# Patient Record
Sex: FEMALE | URBAN - METROPOLITAN AREA
[De-identification: names, ages, dates, MRNs, and addresses within clinical notes are randomized per-mention and may not be internally consistent; named-entity substitution may affect disease eponyms.]

---

## 2022-07-06 ENCOUNTER — LAB REQUISITION (OUTPATIENT)
Dept: LAB | Age: 37
End: 2022-07-06

## 2022-07-06 DIAGNOSIS — R61 GENERALIZED HYPERHIDROSIS: ICD-10-CM

## 2022-07-06 DIAGNOSIS — Z13.29 ENCOUNTER FOR SCREENING FOR OTHER SUSPECTED ENDOCRINE DISORDER: ICD-10-CM

## 2022-07-06 DIAGNOSIS — Z00.00 ENCOUNTER FOR GENERAL ADULT MEDICAL EXAMINATION WITHOUT ABNORMAL FINDINGS: ICD-10-CM

## 2022-07-06 DIAGNOSIS — Z13.21 ENCOUNTER FOR SCREENING FOR NUTRITIONAL DISORDER: ICD-10-CM

## 2022-07-06 DIAGNOSIS — R53.83 OTHER FATIGUE: ICD-10-CM

## 2022-07-07 PROCEDURE — 84481 FREE ASSAY (FT-3): CPT | Performed by: CLINICAL MEDICAL LABORATORY

## 2022-07-07 PROCEDURE — 86663 EPSTEIN-BARR ANTIBODY: CPT | Performed by: CLINICAL MEDICAL LABORATORY

## 2022-07-07 PROCEDURE — 80061 LIPID PANEL: CPT | Performed by: CLINICAL MEDICAL LABORATORY

## 2022-07-07 PROCEDURE — 86800 THYROGLOBULIN ANTIBODY: CPT | Performed by: CLINICAL MEDICAL LABORATORY

## 2022-07-07 PROCEDURE — PSEU8506 C REACTIVE PROTEIN HIGH SENSITIVITY: Performed by: CLINICAL MEDICAL LABORATORY

## 2022-07-07 PROCEDURE — PSEU8476 EPSTEIN BARR VIRUS CHRONIC PANEL: Performed by: CLINICAL MEDICAL LABORATORY

## 2022-07-07 PROCEDURE — PSEU8274 MAGNESIUM: Performed by: CLINICAL MEDICAL LABORATORY

## 2022-07-07 PROCEDURE — 84482 T3 REVERSE: CPT | Performed by: CLINICAL MEDICAL LABORATORY

## 2022-07-07 PROCEDURE — PSEU8223 T3, TOTAL: Performed by: CLINICAL MEDICAL LABORATORY

## 2022-07-07 PROCEDURE — PSEU10042 COPPER, BLOOD: Performed by: CLINICAL MEDICAL LABORATORY

## 2022-07-07 PROCEDURE — PSEU9812 SELENIUM, PLASMA: Performed by: CLINICAL MEDICAL LABORATORY

## 2022-07-07 PROCEDURE — PSEU9633 IODIDE: Performed by: CLINICAL MEDICAL LABORATORY

## 2022-07-07 PROCEDURE — PSEU8904 ZINC: Performed by: CLINICAL MEDICAL LABORATORY

## 2022-07-07 PROCEDURE — 84480 ASSAY TRIIODOTHYRONINE (T3): CPT | Performed by: CLINICAL MEDICAL LABORATORY

## 2022-07-07 PROCEDURE — 84144 ASSAY OF PROGESTERONE: CPT | Performed by: CLINICAL MEDICAL LABORATORY

## 2022-07-07 PROCEDURE — 83525 ASSAY OF INSULIN: CPT | Performed by: CLINICAL MEDICAL LABORATORY

## 2022-07-07 PROCEDURE — 82627 DEHYDROEPIANDROSTERONE: CPT | Performed by: CLINICAL MEDICAL LABORATORY

## 2022-07-07 PROCEDURE — 82525 ASSAY OF COPPER: CPT | Performed by: CLINICAL MEDICAL LABORATORY

## 2022-07-07 PROCEDURE — 83550 IRON BINDING TEST: CPT | Performed by: CLINICAL MEDICAL LABORATORY

## 2022-07-07 PROCEDURE — 82728 ASSAY OF FERRITIN: CPT | Performed by: CLINICAL MEDICAL LABORATORY

## 2022-07-07 PROCEDURE — 86376 MICROSOMAL ANTIBODY EACH: CPT | Performed by: CLINICAL MEDICAL LABORATORY

## 2022-07-07 PROCEDURE — 84439 ASSAY OF FREE THYROXINE: CPT | Performed by: CLINICAL MEDICAL LABORATORY

## 2022-07-07 PROCEDURE — PSEU8532 HOMOCYSTEINE TOTAL: Performed by: CLINICAL MEDICAL LABORATORY

## 2022-07-07 PROCEDURE — PSEU8556 THYROID ANTIBODIES: Performed by: CLINICAL MEDICAL LABORATORY

## 2022-07-07 PROCEDURE — PSEU8229 VITAMIN D -25 HYDROXY: Performed by: CLINICAL MEDICAL LABORATORY

## 2022-07-07 PROCEDURE — 85025 COMPLETE CBC W/AUTO DIFF WBC: CPT | Performed by: CLINICAL MEDICAL LABORATORY

## 2022-07-07 PROCEDURE — PSEU8381 SEDIMENTATION RATE WESTERGREN: Performed by: CLINICAL MEDICAL LABORATORY

## 2022-07-07 PROCEDURE — 83018 HEAVY METAL QUAN EACH NES: CPT | Performed by: CLINICAL MEDICAL LABORATORY

## 2022-07-07 PROCEDURE — PSEU8187 CORTISOL: Performed by: CLINICAL MEDICAL LABORATORY

## 2022-07-07 PROCEDURE — PSEU8115 INSULIN LEVEL, FASTING: Performed by: CLINICAL MEDICAL LABORATORY

## 2022-07-07 PROCEDURE — 82533 TOTAL CORTISOL: CPT | Performed by: CLINICAL MEDICAL LABORATORY

## 2022-07-07 PROCEDURE — PSEU8306 VITAMIN B12: Performed by: CLINICAL MEDICAL LABORATORY

## 2022-07-07 PROCEDURE — 83036 HEMOGLOBIN GLYCOSYLATED A1C: CPT | Performed by: CLINICAL MEDICAL LABORATORY

## 2022-07-07 PROCEDURE — PSEU8286 PROGESTERONE: Performed by: CLINICAL MEDICAL LABORATORY

## 2022-07-07 PROCEDURE — PSEU8135 LIPID PANEL WITH REFLEX: Performed by: CLINICAL MEDICAL LABORATORY

## 2022-07-07 PROCEDURE — PSEU8203 IRON AND TOTAL IRON BINDING CAPACITY: Performed by: CLINICAL MEDICAL LABORATORY

## 2022-07-07 PROCEDURE — 84255 ASSAY OF SELENIUM: CPT | Performed by: CLINICAL MEDICAL LABORATORY

## 2022-07-07 PROCEDURE — PSEU8262 FREE T4: Performed by: CLINICAL MEDICAL LABORATORY

## 2022-07-07 PROCEDURE — PSEU9833 T3, REVERSE: Performed by: CLINICAL MEDICAL LABORATORY

## 2022-07-07 PROCEDURE — 80053 COMPREHEN METABOLIC PANEL: CPT | Performed by: CLINICAL MEDICAL LABORATORY

## 2022-07-07 PROCEDURE — PSEU8268 LACTATE DEHYDROGENASE: Performed by: CLINICAL MEDICAL LABORATORY

## 2022-07-07 PROCEDURE — 83090 ASSAY OF HOMOCYSTEINE: CPT | Performed by: CLINICAL MEDICAL LABORATORY

## 2022-07-07 PROCEDURE — PSEU8299 THYROID STIMULATING HORMONE: Performed by: CLINICAL MEDICAL LABORATORY

## 2022-07-07 PROCEDURE — 83615 LACTATE (LD) (LDH) ENZYME: CPT | Performed by: CLINICAL MEDICAL LABORATORY

## 2022-07-07 PROCEDURE — 84443 ASSAY THYROID STIM HORMONE: CPT | Performed by: CLINICAL MEDICAL LABORATORY

## 2022-07-07 PROCEDURE — PSEU8191 DHEA SULFATE: Performed by: CLINICAL MEDICAL LABORATORY

## 2022-07-07 PROCEDURE — 85652 RBC SED RATE AUTOMATED: CPT | Performed by: CLINICAL MEDICAL LABORATORY

## 2022-07-07 PROCEDURE — 83735 ASSAY OF MAGNESIUM: CPT | Performed by: CLINICAL MEDICAL LABORATORY

## 2022-07-07 PROCEDURE — 86665 EPSTEIN-BARR CAPSID VCA: CPT | Performed by: CLINICAL MEDICAL LABORATORY

## 2022-07-07 PROCEDURE — PSEU8196 FREE T3: Performed by: CLINICAL MEDICAL LABORATORY

## 2022-07-07 PROCEDURE — 86141 C-REACTIVE PROTEIN HS: CPT | Performed by: CLINICAL MEDICAL LABORATORY

## 2022-07-07 PROCEDURE — PSEU8266 GLYCOHEMOGLOBIN: Performed by: CLINICAL MEDICAL LABORATORY

## 2022-07-07 PROCEDURE — 82607 VITAMIN B-12: CPT | Performed by: CLINICAL MEDICAL LABORATORY

## 2022-07-07 PROCEDURE — 82306 VITAMIN D 25 HYDROXY: CPT | Performed by: CLINICAL MEDICAL LABORATORY

## 2022-07-07 PROCEDURE — 83540 ASSAY OF IRON: CPT | Performed by: CLINICAL MEDICAL LABORATORY

## 2022-07-07 PROCEDURE — PSEU8250 COMPREHENSIVE METABOLIC PANEL: Performed by: CLINICAL MEDICAL LABORATORY

## 2022-07-07 PROCEDURE — 84630 ASSAY OF ZINC: CPT | Performed by: CLINICAL MEDICAL LABORATORY

## 2022-07-07 PROCEDURE — 86664 EPSTEIN-BARR NUCLEAR ANTIGEN: CPT | Performed by: CLINICAL MEDICAL LABORATORY

## 2022-07-07 PROCEDURE — PSEU8259 FERRITIN: Performed by: CLINICAL MEDICAL LABORATORY

## 2022-07-08 LAB
25(OH)D3+25(OH)D2 SERPL-MCNC: 65.9 NG/ML (ref 30–100)
ALBUMIN SERPL-MCNC: 3.8 G/DL (ref 3.6–5.1)
ALBUMIN/GLOB SERPL: 1.4 {RATIO} (ref 1–2.4)
ALP SERPL-CCNC: 43 UNITS/L (ref 45–117)
ALT SERPL-CCNC: 24 UNITS/L
ANION GAP SERPL CALC-SCNC: 11 MMOL/L (ref 7–19)
AST SERPL-CCNC: 24 UNITS/L
BASOPHILS # BLD: 0 K/MCL (ref 0–0.3)
BASOPHILS NFR BLD: 1 %
BILIRUB SERPL-MCNC: 0.8 MG/DL (ref 0.2–1)
BUN SERPL-MCNC: 10 MG/DL (ref 6–20)
BUN/CREAT SERPL: 11 (ref 7–25)
CALCIUM SERPL-MCNC: 9.2 MG/DL (ref 8.4–10.2)
CHLORIDE SERPL-SCNC: 109 MMOL/L (ref 97–110)
CHOLEST SERPL-MCNC: 150 MG/DL
CHOLEST/HDLC SERPL: 2.2 {RATIO}
CO2 SERPL-SCNC: 23 MMOL/L (ref 21–32)
CORTIS SERPL-MCNC: 15.4 MCG/DL (ref 3.4–22.5)
CREAT SERPL-MCNC: 0.9 MG/DL (ref 0.51–0.95)
CRP SERPL HS-MCNC: 0.33 MG/L
DEPRECATED RDW RBC: 46.8 FL (ref 39–50)
EBV EA IGG SER-ACNC: <0.2 AI
EBV NA IGG SER-ACNC: >8 AI
EBV VCA IGG SER-ACNC: >8 AI
EBV VCA IGM SER-ACNC: <0.2 AI
EOSINOPHIL # BLD: 0 K/MCL (ref 0–0.5)
EOSINOPHIL NFR BLD: 0 %
ERYTHROCYTE [DISTWIDTH] IN BLOOD: 14.6 % (ref 11–15)
ERYTHROCYTE [SEDIMENTATION RATE] IN BLOOD BY WESTERGREN METHOD: 10 MM/HR (ref 0–20)
FASTING DURATION TIME PATIENT: 8 HOURS (ref 0–999)
FERRITIN SERPL-MCNC: 9 NG/ML (ref 8–252)
GFR SERPLBLD BASED ON 1.73 SQ M-ARVRAT: 85 ML/MIN
GLOBULIN SER-MCNC: 2.8 G/DL (ref 2–4)
GLUCOSE SERPL-MCNC: 95 MG/DL (ref 70–99)
HBA1C MFR BLD: 5 % (ref 4.5–5.6)
HCT VFR BLD CALC: 40.4 % (ref 36–46.5)
HCYS SERPL-SCNC: 7.1 MCMOL/L
HDLC SERPL-MCNC: 67 MG/DL
HGB BLD-MCNC: 13.2 G/DL (ref 12–15.5)
IMM GRANULOCYTES # BLD AUTO: 0 K/MCL (ref 0–0.2)
IMM GRANULOCYTES # BLD: 0 %
INSULIN P FAST SERPL-ACNC: 9 MUNITS/L (ref 3–28)
IRON SATN MFR SERPL: 24 % (ref 15–45)
IRON SERPL-MCNC: 76 MCG/DL (ref 50–170)
LDH SERPL L TO P-CCNC: 164 UNITS/L (ref 82–240)
LDLC SERPL CALC-MCNC: 75 MG/DL
LYMPHOCYTES # BLD: 1.3 K/MCL (ref 1–4.8)
LYMPHOCYTES NFR BLD: 30 %
MAGNESIUM SERPL-MCNC: 2.3 MG/DL (ref 1.7–2.4)
MCH RBC QN AUTO: 28.6 PG (ref 26–34)
MCHC RBC AUTO-ENTMCNC: 32.7 G/DL (ref 32–36.5)
MCV RBC AUTO: 87.6 FL (ref 78–100)
MONOCYTES # BLD: 0.3 K/MCL (ref 0.3–0.9)
MONOCYTES NFR BLD: 6 %
NEUTROPHILS # BLD: 2.8 K/MCL (ref 1.8–7.7)
NEUTROPHILS NFR BLD: 63 %
NONHDLC SERPL-MCNC: 83 MG/DL
NRBC BLD MANUAL-RTO: 0 /100 WBC
PLATELET # BLD AUTO: 291 K/MCL (ref 140–450)
POTASSIUM SERPL-SCNC: 4 MMOL/L (ref 3.4–5.1)
PROGEST SERPL-MCNC: 4.65 NG/ML
PROT SERPL-MCNC: 6.6 G/DL (ref 6.4–8.2)
RBC # BLD: 4.61 MIL/MCL (ref 4–5.2)
SODIUM SERPL-SCNC: 139 MMOL/L (ref 135–145)
T3 SERPL-MCNC: 0.67 NG/ML (ref 0.6–1.81)
T3FREE SERPL-MCNC: 2.6 PG/ML (ref 2.2–4)
T3RU NFR SERPL: 38 % (ref 31–39)
T4 FREE SERPL-MCNC: 1.1 NG/DL (ref 0.8–1.5)
TIBC SERPL-MCNC: 320 MCG/DL (ref 250–450)
TRIGL SERPL-MCNC: 38 MG/DL
TSH SERPL-ACNC: 1.73 MCUNITS/ML (ref 0.35–5)
VIT B12 SERPL-MCNC: 825 PG/ML (ref 211–911)
WBC # BLD: 4.4 K/MCL (ref 4.2–11)

## 2022-07-09 LAB
THYROGLOB AB SERPL-ACNC: <0.9 IU/ML (ref 0–4)
THYROPEROXIDASE AB SERPL-ACNC: 30 UNITS/ML

## 2022-07-10 LAB — SELENIUM SERPL-MCNC: 126.1 UG/L (ref 23–190)

## 2022-07-11 LAB
COPPER SERPL-MCNC: 83 MCG/DL (ref 80–155)
DHEA-S SERPL-MCNC: 291.4 MCG/DL (ref 8–391)
IODINE SERPL-MCNC: 50.7 UG/L (ref 40–92)
ZINC SERPL-MCNC: 63 MCG/DL (ref 70–120)

## 2022-07-13 LAB — T3REVERSE SERPL-MCNC: 18.6 NG/DL (ref 9–27)

## 2022-09-22 ENCOUNTER — APPOINTMENT (OUTPATIENT)
Dept: URBAN - METROPOLITAN AREA CLINIC 249 | Age: 37
Setting detail: DERMATOLOGY
End: 2022-09-22

## 2022-09-22 DIAGNOSIS — Z41.1 ENCOUNTER FOR COSMETIC SURGERY: ICD-10-CM

## 2022-09-22 PROCEDURE — OTHER CONSULTATION - BREAST AUGMENTATION: OTHER

## 2022-09-22 ASSESSMENT — LOCATION DETAILED DESCRIPTION DERM
LOCATION DETAILED: RIGHT MEDIAL BREAST 2-3:00 REGION
LOCATION DETAILED: RIGHT NIPPLE

## 2022-09-22 ASSESSMENT — LOCATION SIMPLE DESCRIPTION DERM: LOCATION SIMPLE: RIGHT BREAST

## 2022-09-22 ASSESSMENT — LOCATION ZONE DERM: LOCATION ZONE: TRUNK

## 2022-09-22 NOTE — PROCEDURE: CONSULTATION - BREAST AUGMENTATION
Send Procedure Quote As Charge: No
Consultation Charge $ (Use Numbers Only, No Text Please.): 100
Detail Level: Detailed

## 2022-10-13 ENCOUNTER — APPOINTMENT (OUTPATIENT)
Dept: URBAN - METROPOLITAN AREA CLINIC 249 | Age: 37
Setting detail: DERMATOLOGY
End: 2022-10-13

## 2022-10-13 DIAGNOSIS — Z41.1 ENCOUNTER FOR COSMETIC SURGERY: ICD-10-CM

## 2022-10-13 PROCEDURE — 99024 POSTOP FOLLOW-UP VISIT: CPT

## 2022-10-13 PROCEDURE — OTHER PRE-OP WORKLIST: OTHER

## 2022-10-13 PROCEDURE — OTHER ADDITIONAL NOTES: OTHER

## 2022-10-13 NOTE — PROCEDURE: ADDITIONAL NOTES
Additional Notes: A pre-operative visit was held with the patient today, which included a review of the procedure, benefits, likely outcome, alternatives, and risks of surgery as described by the ASPS guidelines. These include, but are not limited to bleeding, infection, hematoma, seroma, wound breakdown, scarring, asymmetry, injury to surrounding structures, need for repeat procedures, poor cosmetic result, venous thromboembolism, cardiovascular events and even death. All questions were answered and the patient was instructed to call the office if any further questions arise.\\n\\n Surgical consent obtained and scanned into chart, Pre-operative photos obtained and scanned into chart, Implants/devices ordered, Mammogram within the last year reviewed with normal findings, Patient holding all blood thinners, herbal medications, hormonal supplements, Post-operative pain protocol discussed and prescriptions provided to patient, Patient has been seen by primary care provider and work-up performed finding that patient is acceptable candidate for surgery.\\n\\n Pre-operative and post-operative instructions were provided to patient, Breast Implants-A thorough discussion on breast implants was had with the patient, and an appropriately sized pair of implants was selected by the patient based on her anatomy, breast measurements, goals and desires. \\n\\nThe risks, benefits and alternatives were discussed including, but not limited to, capsular contracture, implant failure(rupture,leakage), the risk for breast implant associated anaplastic large cell lymphoma(FADI-ALCL), malposition, asymmetry, rippling, changes in nipple sensation. \\n\\nThe patient is aware of the FDA recommendation for MRI screening at 3 years and then every 2 years with silicone gel implants.\\n\\n Drains- The possibility of drain placement during surgery was discussed in order to minimize the risk for seroma and swelling. The duration of this drain will be determined by its quality and quantity over time. Drain education was performed., We discussed that re-operations are sometimes necessary after cosmetic surgery, and that the patient would be responsible for these additional costs.
Render Risk Assessment In Note?: no
Additional Notes: A pre-operative visit was held with the patient today, which included a review of the procedure, benefits, likely outcome, alternatives, and risks of surgery as described by the ASPS guidelines. These include, but are not limited to bleeding, infection, hematoma, seroma, wound breakdown, scarring, asymmetry, injury to surrounding structures, need for repeat procedures, poor cosmetic result, venous thromboembolism, cardiovascular events and even death. All questions were answered and the patient was instructed to call the office if any further questions arise.\\n\\n Surgical consent obtained and scanned into chart, Pre-operative photos obtained and scanned into chart, Patient holding all blood thinners, herbal medications, hormonal supplements, Post-operative pain protocol discussed and prescriptions provided to patient, Patient has been seen by primary care provider and work-up performed finding that patient is acceptable candidate for surgery.\\n\\n Pre-operative and post-operative instructions were provided to patient, Breast Implants-A thorough discussion on breast implants was had with the patient, and an appropriately sized pair of implants was selected by the patient based on her anatomy, breast measurements, goals and desires. \\n\\n Drains- The possibility of drain placement during surgery was discussed in order to minimize the risk for seroma and swelling. The duration of this drain will be determined by its quality and quantity over time. Drain education was performed., We discussed that re-operations are sometimes necessary after cosmetic surgery, and that the patient would be responsible for these additional costs.

## 2022-11-03 ENCOUNTER — RX ONLY (RX ONLY)
Age: 37
End: 2022-11-03

## 2022-11-03 ENCOUNTER — APPOINTMENT (OUTPATIENT)
Dept: URBAN - METROPOLITAN AREA CLINIC 248 | Age: 37
Setting detail: DERMATOLOGY
End: 2022-11-03

## 2022-11-03 DIAGNOSIS — Z48.89 ENCOUNTER FOR OTHER SPECIFIED SURGICAL AFTERCARE: ICD-10-CM

## 2022-11-03 PROCEDURE — OTHER COUNSELING - POST-OP CHECK: OTHER

## 2022-11-03 PROCEDURE — 99024 POSTOP FOLLOW-UP VISIT: CPT

## 2022-11-03 RX ORDER — DESONIDE 0.5 MG/G
OINTMENT TOPICAL
Qty: 60 | Refills: 0 | Status: ERX | COMMUNITY
Start: 2022-11-03

## 2022-12-07 ENCOUNTER — APPOINTMENT (OUTPATIENT)
Dept: URBAN - METROPOLITAN AREA CLINIC 248 | Age: 37
Setting detail: DERMATOLOGY
End: 2022-12-07

## 2022-12-08 ENCOUNTER — APPOINTMENT (OUTPATIENT)
Dept: URBAN - METROPOLITAN AREA CLINIC 248 | Age: 37
Setting detail: DERMATOLOGY
End: 2022-12-10

## 2022-12-08 DIAGNOSIS — Z48.89 ENCOUNTER FOR OTHER SPECIFIED SURGICAL AFTERCARE: ICD-10-CM

## 2022-12-08 PROCEDURE — OTHER COUNSELING - POST-OP CHECK: OTHER

## 2022-12-08 PROCEDURE — 99024 POSTOP FOLLOW-UP VISIT: CPT

## 2022-12-08 NOTE — HPI: POST-OP CHECK
History: This is a 37 year old female who is being seen for a post-op check, to assess healing of the \\nbilateral breast\\naugmentation with silicone implants on 10/28/2022

## 2023-01-19 ENCOUNTER — APPOINTMENT (OUTPATIENT)
Dept: URBAN - METROPOLITAN AREA CLINIC 248 | Age: 38
Setting detail: DERMATOLOGY
End: 2023-01-19

## 2023-01-19 DIAGNOSIS — Z48.89 ENCOUNTER FOR OTHER SPECIFIED SURGICAL AFTERCARE: ICD-10-CM

## 2023-01-19 PROCEDURE — 99024 POSTOP FOLLOW-UP VISIT: CPT

## 2023-01-19 PROCEDURE — OTHER COUNSELING - POST-OP CHECK: OTHER
